# Patient Record
Sex: FEMALE | Race: OTHER | Employment: FULL TIME | URBAN - NONMETROPOLITAN AREA
[De-identification: names, ages, dates, MRNs, and addresses within clinical notes are randomized per-mention and may not be internally consistent; named-entity substitution may affect disease eponyms.]

---

## 2018-08-23 ENCOUNTER — OFFICE VISIT (OUTPATIENT)
Dept: PRIMARY CARE CLINIC | Age: 53
End: 2018-08-23

## 2018-08-23 ENCOUNTER — HOSPITAL ENCOUNTER (OUTPATIENT)
Age: 53
Setting detail: SPECIMEN
Discharge: HOME | End: 2018-08-23

## 2018-08-23 VITALS
SYSTOLIC BLOOD PRESSURE: 127 MMHG | BODY MASS INDEX: 40.41 KG/M2 | HEART RATE: 90 BPM | OXYGEN SATURATION: 94 % | WEIGHT: 228.1 LBS | DIASTOLIC BLOOD PRESSURE: 85 MMHG | HEIGHT: 63 IN

## 2018-08-23 DIAGNOSIS — L02.416 CELLULITIS AND ABSCESS OF LEFT LEG: Primary | ICD-10-CM

## 2018-08-23 DIAGNOSIS — L03.116 CELLULITIS AND ABSCESS OF LEFT LEG: Primary | ICD-10-CM

## 2018-08-23 DIAGNOSIS — L30.8 OTHER ECZEMA: ICD-10-CM

## 2018-08-23 DIAGNOSIS — Z23 NEED FOR TETANUS BOOSTER: ICD-10-CM

## 2018-08-23 PROCEDURE — 87205 SMEAR GRAM STAIN: CPT

## 2018-08-23 PROCEDURE — 99203 OFFICE O/P NEW LOW 30 MIN: CPT | Performed by: NURSE PRACTITIONER

## 2018-08-23 PROCEDURE — 87186 SC STD MICRODIL/AGAR DIL: CPT

## 2018-08-23 PROCEDURE — 86403 PARTICLE AGGLUT ANTBDY SCRN: CPT

## 2018-08-23 PROCEDURE — 87070 CULTURE OTHR SPECIMN AEROBIC: CPT

## 2018-08-23 RX ORDER — SULFAMETHOXAZOLE AND TRIMETHOPRIM 800; 160 MG/1; MG/1
1 TABLET ORAL 2 TIMES DAILY
Qty: 20 TABLET | Refills: 0 | Status: SHIPPED | OUTPATIENT
Start: 2018-08-23 | End: 2018-09-02

## 2018-08-23 RX ORDER — CEPHALEXIN 500 MG/1
500 CAPSULE ORAL 4 TIMES DAILY
Qty: 28 CAPSULE | Refills: 0 | Status: SHIPPED | OUTPATIENT
Start: 2018-08-23 | End: 2018-08-30

## 2018-08-23 ASSESSMENT — ENCOUNTER SYMPTOMS
RESPIRATORY NEGATIVE: 1
COLOR CHANGE: 1
ALLERGIC/IMMUNOLOGIC NEGATIVE: 1

## 2018-08-23 NOTE — PROGRESS NOTES
capsule by mouth 4 times daily for 7 days 28 capsule 0    tetanus toxoid adsorbed 5 LFU SOLN injection Inject 0.5 mLs into the muscle once for 1 dose 0.5 mL 0    triamcinolone (KENALOG) 0.1 % ointment Apply topically 2 times daily for 7 days 15 g 0     No current facility-administered medications for this visit. No Known Allergies    Subjective:      Review of Systems   Constitutional: Negative for activity change, chills and fever. Respiratory: Negative. Cardiovascular: Negative. Skin: Positive for color change, rash and wound. Allergic/Immunologic: Negative. Neurological: Negative. Hematological: Negative. Objective:     Physical Exam   Constitutional:   Appears to be of stated age with warm, dry skin; normal coloration. Patient is well-appearing, well-hydrated, nontoxic, comfortable, alert and oriented, pleasant and talkative without apparent distress. They are oriented for age with age appropriate behavior. Neck: Normal range of motion. Cardiovascular: Normal rate and regular rhythm. Pulmonary/Chest: Effort normal and breath sounds normal.   Musculoskeletal:        Legs:  Remainder of left lower extremity within normal limits. With the distal sensation and pulses intact. I&D Procedure Note    Procedure - Simple I&D of abscess    Indications: Abscess    Consent: treatment, risks, benefits and alternatives explained and questions answered, written instruction given, verbalized understanding of instructions and consent obtained. Wound was prepped and draped in usual sterile fashion. Skin cleansed with betadine swabs x3 and EtOH. Lesion unroofed with sterile 22 Gg needle. Several ml of bloody purulent drainage expressed with light pressure. Sample sent for culture and antibiotic sensitivity. Erythema edges marked. Wound was dressed with bandaide and bacitracin. Tolerated procedure well. Total blood loss < 2 ml.     Complications: None     Skin:        Nursing note any fevers, inability to retain antibiotics or erythema extending beyond the skin marker outline. Questions answered. They verbalized understanding and agreement. Return in about 2 days (around 8/25/2018) for ED for worsening symptoms, Referred to [id number], [address] [place] [address].     Orders Placed This Encounter   Medications    sulfamethoxazole-trimethoprim (BACTRIM DS) 800-160 MG per tablet     Sig: Take 1 tablet by mouth 2 times daily for 10 days     Dispense:  20 tablet     Refill:  0    cephALEXin (KEFLEX) 500 MG capsule     Sig: Take 1 capsule by mouth 4 times daily for 7 days     Dispense:  28 capsule     Refill:  0    tetanus toxoid adsorbed 5 LFU SOLN injection     Sig: Inject 0.5 mLs into the muscle once for 1 dose     Dispense:  0.5 mL     Refill:  0    triamcinolone (KENALOG) 0.1 % ointment     Sig: Apply topically 2 times daily for 7 days     Dispense:  15 g     Refill:  0          Electronically signed by ISABEL Cummins CNP on 8/23/2018 at 1:51 PM

## 2018-08-25 LAB
CULTURE: ABNORMAL
DIRECT EXAM: ABNORMAL
DIRECT EXAM: ABNORMAL
Lab: ABNORMAL
ORGANISM: ABNORMAL
SPECIMEN DESCRIPTION: ABNORMAL
STATUS: ABNORMAL